# Patient Record
Sex: MALE | URBAN - METROPOLITAN AREA
[De-identification: names, ages, dates, MRNs, and addresses within clinical notes are randomized per-mention and may not be internally consistent; named-entity substitution may affect disease eponyms.]

---

## 2020-06-01 ENCOUNTER — OFFICE VISIT (OUTPATIENT)
Dept: URBAN - METROPOLITAN AREA SURGERY CENTER 25 | Facility: SURGERY CENTER | Age: 40
End: 2020-06-01

## 2020-07-13 ENCOUNTER — CLAIMS CREATED FROM THE CLAIM WINDOW (OUTPATIENT)
Dept: URBAN - METROPOLITAN AREA CLINIC 4 | Facility: CLINIC | Age: 40
End: 2020-07-13
Payer: COMMERCIAL

## 2020-07-13 ENCOUNTER — OFFICE VISIT (OUTPATIENT)
Dept: URBAN - METROPOLITAN AREA SURGERY CENTER 25 | Facility: SURGERY CENTER | Age: 40
End: 2020-07-13

## 2020-07-13 DIAGNOSIS — D50.9 IRON DEFICIENCY ANEMIA, UNSPECIFIED: ICD-10-CM

## 2020-07-13 DIAGNOSIS — R25.9 UNSPECIFIED ABNORMAL INVOLUNTARY MOVEMENTS: ICD-10-CM

## 2020-07-13 DIAGNOSIS — R63.4 ABNORMAL WEIGHT LOSS: ICD-10-CM

## 2020-07-13 DIAGNOSIS — R10.9 UNSPECIFIED ABDOMINAL PAIN: ICD-10-CM

## 2020-07-13 DIAGNOSIS — R11.2 NAUSEA WITH VOMITING, UNSPECIFIED: ICD-10-CM

## 2020-07-13 PROCEDURE — 88305 TISSUE EXAM BY PATHOLOGIST: CPT | Performed by: PATHOLOGY

## 2020-07-13 PROCEDURE — 88312 SPECIAL STAINS GROUP 1: CPT | Performed by: PATHOLOGY

## 2020-07-25 ENCOUNTER — TELEPHONE ENCOUNTER (OUTPATIENT)
Dept: URBAN - METROPOLITAN AREA CLINIC 13 | Facility: CLINIC | Age: 40
End: 2020-07-25

## 2020-07-26 ENCOUNTER — TELEPHONE ENCOUNTER (OUTPATIENT)
Dept: URBAN - METROPOLITAN AREA CLINIC 13 | Facility: CLINIC | Age: 40
End: 2020-07-26

## 2020-07-26 RX ORDER — FEXOFENADINE HCL 180 MG/1
TAKE 1 TABLET BY MOUTH TWICE DAILY TABLET ORAL
Qty: 14 | Refills: 0 | Status: ACTIVE | COMMUNITY
Start: 2020-03-13

## 2020-07-26 RX ORDER — AMITRIPTYLINE HYDROCHLORIDE 150 MG/1
TAKE 1 TABLET BY MOUTH AT BEDTIME TABLET, FILM COATED ORAL
Qty: 30 | Refills: 0 | Status: ACTIVE | COMMUNITY
Start: 2019-12-11

## 2020-07-26 RX ORDER — NEOMYCIN AND POLYMYXIN B SULFATES AND DEXAMETHASONE 1; 3.5; 1 MG/G; MG/G; [IU]/G
APPLY OINTMENT INTO RIGHT EYE AT BEDTIME OINTMENT OPHTHALMIC
Qty: 4 | Refills: 0 | Status: ACTIVE | COMMUNITY
Start: 2019-06-19

## 2020-07-26 RX ORDER — SERTRALINE 25 MG/1
TABLET, FILM COATED ORAL
Qty: 30 | Refills: 0 | Status: ACTIVE | COMMUNITY
Start: 2019-05-11

## 2020-07-26 RX ORDER — TERBINAFINE HYDROCHLORIDE 250 MG/1
TABLET ORAL
Qty: 7 | Refills: 0 | Status: ACTIVE | COMMUNITY
Start: 2020-06-30

## 2020-07-26 RX ORDER — TERBINAFINE HYDROCHLORIDE 250 MG/1
TAKE 1 TABLET BY MOUTH ONCE DAILY TABLET ORAL
Qty: 14 | Refills: 0 | Status: ACTIVE | COMMUNITY
Start: 2019-06-05

## 2020-07-26 RX ORDER — CICLOPIROX 80 MG/ML
SOLUTION TOPICAL
Qty: 6 | Refills: 0 | Status: ACTIVE | COMMUNITY
Start: 2018-12-26

## 2020-07-26 RX ORDER — AZATHIOPRINE 50 1/1
TAKE 1 TABLET BY MOUTH ONCE DAILY TABLET ORAL
Qty: 30 | Refills: 0 | Status: ACTIVE | COMMUNITY
Start: 2020-02-05

## 2020-07-26 RX ORDER — GABAPENTIN 300 MG/1
CAPSULE ORAL
Qty: 90 | Refills: 0 | Status: ACTIVE | COMMUNITY
Start: 2020-02-12

## 2020-07-26 RX ORDER — PREDNISONE 20 MG/1
TAKE 3 TABLETS BY MOUTH ONCE DAILY TABLET ORAL
Qty: 90 | Refills: 0 | Status: ACTIVE | COMMUNITY
Start: 2019-10-28

## 2020-07-26 RX ORDER — DAPSONE 25 MG/1
TABLET ORAL
Qty: 60 | Refills: 0 | Status: ACTIVE | COMMUNITY
Start: 2020-04-07

## 2020-07-26 RX ORDER — METRONIDAZOLE 10 MG/G
GEL TOPICAL
Qty: 60 | Refills: 0 | Status: ACTIVE | COMMUNITY
Start: 2018-09-14

## 2020-07-26 RX ORDER — MESALAMINE 1.2 G/1
TABLET, DELAYED RELEASE ORAL
Qty: 60 | Refills: 0 | Status: ACTIVE | COMMUNITY
Start: 2019-11-06

## 2020-07-26 RX ORDER — MESALAMINE 1.2 G/1
TABLET, DELAYED RELEASE ORAL
Qty: 60 | Refills: 0 | Status: ACTIVE | COMMUNITY
Start: 2019-10-10

## 2020-07-26 RX ORDER — FAMOTIDINE 20 MG/1
TAKE 1 TABLET BY MOUTH TWICE DAILY TABLET ORAL
Qty: 20 | Refills: 0 | Status: ACTIVE | COMMUNITY
Start: 2020-03-13

## 2020-07-26 RX ORDER — BUDESONIDE 3 MG/1
TAKE 3 CAPSULES BY MOUTH ONCE DAILY IN THE MORNING CAPSULE, COATED PELLETS ORAL
Qty: 90 | Refills: 0 | Status: ACTIVE | COMMUNITY
Start: 2020-03-02

## 2020-08-03 ENCOUNTER — DASHBOARD ENCOUNTERS (OUTPATIENT)
Age: 40
End: 2020-08-03

## 2020-08-03 ENCOUNTER — OFFICE VISIT (OUTPATIENT)
Dept: URBAN - METROPOLITAN AREA CLINIC 113 | Facility: CLINIC | Age: 40
End: 2020-08-03
Payer: COMMERCIAL

## 2020-08-03 VITALS
HEIGHT: 70 IN | BODY MASS INDEX: 23.34 KG/M2 | DIASTOLIC BLOOD PRESSURE: 82 MMHG | HEART RATE: 72 BPM | WEIGHT: 163 LBS | SYSTOLIC BLOOD PRESSURE: 116 MMHG | TEMPERATURE: 96.9 F

## 2020-08-03 DIAGNOSIS — R53.82 CHRONIC FATIGUE: ICD-10-CM

## 2020-08-03 PROCEDURE — 99213 OFFICE O/P EST LOW 20 MIN: CPT | Performed by: INTERNAL MEDICINE

## 2020-08-03 PROCEDURE — G8420 CALC BMI NORM PARAMETERS: HCPCS | Performed by: INTERNAL MEDICINE

## 2020-08-03 RX ORDER — PREDNISONE 20 MG/1
1 TABLET TABLET ORAL
Refills: 0 | Status: ACTIVE | COMMUNITY
Start: 2019-10-28

## 2020-08-03 RX ORDER — DAPSONE 25 MG/1
TABLET ORAL
Qty: 60 | Refills: 0 | Status: ON HOLD | COMMUNITY
Start: 2020-04-07

## 2020-08-03 RX ORDER — FAMOTIDINE 20 MG/1
TAKE 1 TABLET BY MOUTH TWICE DAILY TABLET ORAL
Qty: 20 | Refills: 0 | Status: ON HOLD | COMMUNITY
Start: 2020-03-13

## 2020-08-03 RX ORDER — AZATHIOPRINE 50 1/1
TAKE 1 TABLET BY MOUTH ONCE DAILY TABLET ORAL
Qty: 30 | Refills: 0 | Status: ON HOLD | COMMUNITY
Start: 2020-02-05

## 2020-08-03 RX ORDER — SERTRALINE 25 MG/1
TABLET, FILM COATED ORAL
Qty: 30 | Refills: 0 | Status: ON HOLD | COMMUNITY
Start: 2019-05-11

## 2020-08-03 RX ORDER — METRONIDAZOLE 10 MG/G
GEL TOPICAL
Qty: 60 | Refills: 0 | Status: ON HOLD | COMMUNITY
Start: 2018-09-14

## 2020-08-03 RX ORDER — CICLOPIROX 80 MG/ML
SOLUTION TOPICAL
Qty: 6 | Refills: 0 | Status: ON HOLD | COMMUNITY
Start: 2018-12-26

## 2020-08-03 RX ORDER — NEOMYCIN AND POLYMYXIN B SULFATES AND DEXAMETHASONE 1; 3.5; 1 MG/G; MG/G; [IU]/G
APPLY OINTMENT INTO RIGHT EYE AT BEDTIME OINTMENT OPHTHALMIC
Qty: 4 | Refills: 0 | Status: ON HOLD | COMMUNITY
Start: 2019-06-19

## 2020-08-03 RX ORDER — BUDESONIDE 3 MG/1
TAKE 3 CAPSULES BY MOUTH ONCE DAILY IN THE MORNING CAPSULE, COATED PELLETS ORAL
Qty: 90 | Refills: 0 | Status: ON HOLD | COMMUNITY
Start: 2020-03-02

## 2020-08-03 RX ORDER — TERBINAFINE HYDROCHLORIDE 250 MG/1
TAKE 1 TABLET BY MOUTH ONCE DAILY TABLET ORAL
Qty: 14 | Refills: 0 | Status: ON HOLD | COMMUNITY
Start: 2019-06-05

## 2020-08-03 RX ORDER — MESALAMINE 1.2 G/1
TABLET, DELAYED RELEASE ORAL
Qty: 60 | Refills: 0 | Status: ON HOLD | COMMUNITY
Start: 2019-10-10

## 2020-08-03 RX ORDER — GABAPENTIN 300 MG/1
CAPSULE ORAL
Qty: 90 | Refills: 0 | Status: ON HOLD | COMMUNITY
Start: 2020-02-12

## 2020-08-03 RX ORDER — FEXOFENADINE HCL 180 MG/1
TAKE 1 TABLET BY MOUTH TWICE DAILY TABLET ORAL
Qty: 14 | Refills: 0 | Status: ON HOLD | COMMUNITY
Start: 2020-03-13

## 2020-08-03 RX ORDER — AMITRIPTYLINE HYDROCHLORIDE 150 MG/1
TAKE 1 TABLET BY MOUTH AT BEDTIME TABLET, FILM COATED ORAL
Qty: 30 | Refills: 0 | Status: ON HOLD | COMMUNITY
Start: 2019-12-11

## 2020-08-03 NOTE — PHYSICAL EXAM GASTROINTESTINAL
Abdomen,  soft, nontender, nondistended,  no guarding or rigidity,  no masses palpable,  normal bowel sounds

## 2020-08-03 NOTE — HPI-TODAY'S VISIT:
He was last seen 1/2/20 for evaluation of various vague complaints, to include postprandial fatigue, eye pain, rashes, and tinnitus in the setting of unintentional weight loss. He was instructed to wean prednisone, and an EGD was planned.  EGD 7/13/20 revealed erythematous mucosa in the lesser curvature of the gastric body, and ectopic gastric mucosa in the upper third of the esophagus. Gastric and random duodenal biopsies were unremarkable.  His symptoms are unchanged. He continues to experience "flares" of fatigue, with postprandial rashes to his chest, back and face. The symptoms seem to worsen following consumption of certain foods, or at a particular time of day, though he cannot characterize the events. He denies abdominal pain, nausea, vomiting, change in bowel habits or blood per rectum. His weight has increased 20lb since the time of his last visit, however, he has resumed prednisone 20mg daily, as this is the only medication he has found that gives him relief. His sister is a nurse practitioner, and prescribed the medication. He has not had any abdominal imaging or a rheumatology evaluation. A prior allergy work-up was nondiagnostic. He has tried an elimination diet without change.